# Patient Record
Sex: FEMALE | Race: WHITE | NOT HISPANIC OR LATINO | URBAN - METROPOLITAN AREA
[De-identification: names, ages, dates, MRNs, and addresses within clinical notes are randomized per-mention and may not be internally consistent; named-entity substitution may affect disease eponyms.]

---

## 2018-02-11 ENCOUNTER — EMERGENCY (EMERGENCY)
Facility: HOSPITAL | Age: 33
LOS: 1 days | Discharge: HOME | End: 2018-02-11
Attending: EMERGENCY MEDICINE | Admitting: EMERGENCY MEDICINE
Payer: COMMERCIAL

## 2018-02-11 VITALS
HEIGHT: 66 IN | OXYGEN SATURATION: 99 % | WEIGHT: 149.91 LBS | SYSTOLIC BLOOD PRESSURE: 93 MMHG | HEART RATE: 101 BPM | TEMPERATURE: 103 F | RESPIRATION RATE: 18 BRPM | DIASTOLIC BLOOD PRESSURE: 60 MMHG

## 2018-02-11 DIAGNOSIS — J10.1 INFLUENZA DUE TO OTHER IDENTIFIED INFLUENZA VIRUS WITH OTHER RESPIRATORY MANIFESTATIONS: ICD-10-CM

## 2018-02-11 DIAGNOSIS — Z79.1 LONG TERM (CURRENT) USE OF NON-STEROIDAL ANTI-INFLAMMATORIES (NSAID): ICD-10-CM

## 2018-02-11 DIAGNOSIS — Z79.899 OTHER LONG TERM (CURRENT) DRUG THERAPY: ICD-10-CM

## 2018-02-11 DIAGNOSIS — R05 COUGH: ICD-10-CM

## 2018-02-11 DIAGNOSIS — R00.0 TACHYCARDIA, UNSPECIFIED: ICD-10-CM

## 2018-02-11 LAB
FLUAV H3 RNA SPEC QL NAA+PROBE: DETECTED
RAPID RVP RESULT: DETECTED

## 2018-02-11 PROCEDURE — 99283 EMERGENCY DEPT VISIT LOW MDM: CPT | Mod: 25

## 2018-02-11 PROCEDURE — 87633 RESP VIRUS 12-25 TARGETS: CPT

## 2018-02-11 PROCEDURE — 87486 CHLMYD PNEUM DNA AMP PROBE: CPT

## 2018-02-11 PROCEDURE — 99284 EMERGENCY DEPT VISIT MOD MDM: CPT

## 2018-02-11 PROCEDURE — 87581 M.PNEUMON DNA AMP PROBE: CPT

## 2018-02-11 PROCEDURE — 71046 X-RAY EXAM CHEST 2 VIEWS: CPT | Mod: 26

## 2018-02-11 PROCEDURE — 87798 DETECT AGENT NOS DNA AMP: CPT

## 2018-02-11 PROCEDURE — 71046 X-RAY EXAM CHEST 2 VIEWS: CPT

## 2018-02-11 RX ORDER — IBUPROFEN 200 MG
600 TABLET ORAL ONCE
Qty: 0 | Refills: 0 | Status: COMPLETED | OUTPATIENT
Start: 2018-02-11 | End: 2018-02-11

## 2018-02-11 RX ORDER — ACETAMINOPHEN 500 MG
975 TABLET ORAL ONCE
Qty: 0 | Refills: 0 | Status: DISCONTINUED | OUTPATIENT
Start: 2018-02-11 | End: 2018-02-11

## 2018-02-11 RX ORDER — IBUPROFEN 200 MG
1 TABLET ORAL
Qty: 30 | Refills: 0 | OUTPATIENT
Start: 2018-02-11

## 2018-02-11 RX ORDER — ONDANSETRON 8 MG/1
1 TABLET, FILM COATED ORAL
Qty: 9 | Refills: 0 | OUTPATIENT
Start: 2018-02-11

## 2018-02-11 RX ORDER — SODIUM CHLORIDE 9 MG/ML
1000 INJECTION INTRAMUSCULAR; INTRAVENOUS; SUBCUTANEOUS ONCE
Qty: 0 | Refills: 0 | Status: COMPLETED | OUTPATIENT
Start: 2018-02-11 | End: 2018-02-11

## 2018-02-11 RX ADMIN — Medication 600 MILLIGRAM(S): at 14:58

## 2018-02-11 RX ADMIN — SODIUM CHLORIDE 1000 MILLILITER(S): 9 INJECTION INTRAMUSCULAR; INTRAVENOUS; SUBCUTANEOUS at 16:06

## 2018-02-11 NOTE — ED ADULT TRIAGE NOTE - CHIEF COMPLAINT QUOTE
Patient c/o body ache , back pain and cough for 2 days . Stated she went to urgent care last night and she got diagnosed with positive for flu without swabbing her nose .

## 2018-02-11 NOTE — ED PROVIDER NOTE - ATTENDING CONTRIBUTION TO CARE
pt is a 31yo f, who p/w f/c, cough, chest pain w/ cough, myalgias since yesterday. Seen at an [facility] and treated presumptively for influenza with tamiflu, however pt vomiting after taking first dose. No sob, abd pain, diarrhea, urinary sx's. Pt is here for testing for flu. Febrile with associated tachycardia. WNWD f in nad. + s1, s2, rrr. Lungs cta b/l. Abd soft, nt/nd, no guarding/ rebound. RVP + for flu. CXR neg for i/e. Pt hydrated with ivf, given ibuprofen with improvement of sx's. Pt advised on finishing course of tamiflu. Will prescribe zofran as well for nausea. Pt to f/u with pcp for re-evaluation.

## 2018-02-11 NOTE — ED PROVIDER NOTE - MEDICAL DECISION MAKING DETAILS
33 y/o f +influenza A; given IV hydration, ibuprofen, feeling better, cxr neg, will d/c with rx zofran, continue tamiflu, oral hydration, rest, ibuprofen, f/u pmd

## 2018-02-11 NOTE — ED PROVIDER NOTE - OBJECTIVE STATEMENT
31 y/o f no pmh presents stating having fever, body aches, cough since yesterday.  Pt went to urgent care, was told it was influenza although no confirmatory test sent.  Pt stating she took one dose of tamiflu and vomited.  Pt concerned, wants to be tested for influenza, still having body aches and fever despite taking tylenol.  Denies SOB, diarrhea, abd pain, all other ROS negative.

## 2018-02-11 NOTE — ED ADULT NURSE NOTE - OBJECTIVE STATEMENT
[place] c/o flu like symptoms, dry cough , chills , weakness , back pain , headaches , pt went to [facility] yesterday without flu swab pt was diagnosed with influenza and was given Tamiflu

## 2020-04-13 NOTE — ED ADULT TRIAGE NOTE - TEMPERATURE IN CELSIUS (DEGREES C)
No frequent meals. Drink plenty of fluids. Take medications as directed. Come back if you get worse.
37.3

## 2022-01-03 PROBLEM — Z00.00 ENCOUNTER FOR PREVENTIVE HEALTH EXAMINATION: Status: ACTIVE | Noted: 2022-01-03

## 2022-01-05 ENCOUNTER — APPOINTMENT (OUTPATIENT)
Dept: VASCULAR SURGERY | Facility: CLINIC | Age: 37
End: 2022-01-05
Payer: COMMERCIAL

## 2022-01-05 ENCOUNTER — TRANSCRIPTION ENCOUNTER (OUTPATIENT)
Age: 37
End: 2022-01-05

## 2022-01-05 VITALS
BODY MASS INDEX: 27.32 KG/M2 | WEIGHT: 170 LBS | HEART RATE: 76 BPM | SYSTOLIC BLOOD PRESSURE: 97 MMHG | HEIGHT: 66 IN | DIASTOLIC BLOOD PRESSURE: 62 MMHG

## 2022-01-05 DIAGNOSIS — M79.89 OTHER SPECIFIED SOFT TISSUE DISORDERS: ICD-10-CM

## 2022-01-05 DIAGNOSIS — I83.93 ASYMPTOMATIC VARICOSE VEINS OF BILATERAL LOWER EXTREMITIES: ICD-10-CM

## 2022-01-05 DIAGNOSIS — I83.90 ASYMPTOMATIC VARICOSE VEINS OF UNSPECIFIED LOWER EXTREMITY: ICD-10-CM

## 2022-01-05 DIAGNOSIS — I80.02 PHLEBITIS AND THROMBOPHLEBITIS OF SUPERFICIAL VESSELS OF LEFT LOWER EXTREMITY: ICD-10-CM

## 2022-01-05 PROCEDURE — 93970 EXTREMITY STUDY: CPT

## 2022-01-05 PROCEDURE — 99204 OFFICE O/P NEW MOD 45 MIN: CPT

## 2022-01-12 NOTE — ADDENDUM
[FreeTextEntry1] : I, Dr. Brice Dang, personally performed the evaluation and management (E/M) services for this new patient.  That E/M includes conducting the initial examination, assessing all conditions, and establishing the plan of care.  Today, my ACP, Gladys Royal NP, was here to observe my evaluation and management services for this patient to be followed going forward.

## 2022-01-12 NOTE — ASSESSMENT
[Arterial/Venous Disease] : arterial/venous disease [FreeTextEntry1] : 37 y/o F w/ LEs leg heaviness and spider veins with recent sclerotherapy on L ankle area and superficial phlebitis. On exam, BLEs w/ with mild generalized edema. Scattered spider veins throughout both LEs. No bulging varicose veins. Indurated area just anterior to the L lateral malleolus and another small indurated area anterior to the R medial malleolus, slightly tender. Venous duplex b/l Les ordered to r/o reflux, shows: No DVT/SVT or reflux bilaterally. L ankle SVT. I recommend her to start wearing daily compression stockings (prescription provided, 20-30mmHg). I do not recommend sclerotherapy or laser for the spider veins as this will not alleviate her symptoms. I have asked her to update us on her leg symptoms and she may f/u PRN.

## 2022-01-12 NOTE — CONSULT LETTER
[Dear  ___] : Dear  [unfilled], [FreeTextEntry2] : Jonathon Rehman MD\par [address] [address] [FreeTextEntry1] : I saw your patient today.  This very nice 36-year-old woman presents with pain and swelling in both ankles.  Her legs have always been heavy and she admits that she has legs like her mother.  She also has numerous tiny venous telangiectasias in both legs and ankles.  She had sclerotherapy of vein sin the left leg near the ankle in November.  She is left with two indurated and symptomatic areas as a result, one lateral and one medial.\par \par As noted her legs are heavy.  She is overweight.  There is an indurated area just anterior to the left lateral malleolus and another small indurated area anterior to the right medial malleolus.  Both are slightly tender.\par \par We performed a venous duplex scan in the office.  There is no evidence of deep or superficial venous insufficiency or obstruction.\par \par I had a discussion with Mrs. Oconnor.  Unfortunately there is nothing to do to make her ankle symptoms improve other than to suggest surgical support hosiery.  I made it clear to her that sclerotherapy of the tiny telangiectasias will not alleviate her symptoms.  I explained to her that the condition of her legs is hereditary which she needs to come to  with.  She was not terribly pleased with this.  \par \par I asked her to go to a store that specializes in surgical support hosiery.  I have given her a prescription for low pressure, 20 to 30 mmHg, calf length.  She thinks that the left leg may be too symptomatic from the persistent inflammation as a result of sclerotherapy so I suggested that she try the stocking on the right leg for now and keep me informed.  \par \par I will keep you posted regarding her progress.  \par \par Sincerely [FreeTextEntry3] : Sincerely, \par \par Brice Dang M.D. \par , Surgical [facility]\par , Department of Surgery [facility]\par Professor of Surgery, [employer] at [facility]

## 2022-01-12 NOTE — PROCEDURE
[FreeTextEntry1] : Venous duplex b/l Les ordered to r/o reflux, shows: No DVT/SVT or reflux bilaterally. L ankle SVT.

## 2022-01-12 NOTE — HISTORY OF PRESENT ILLNESS
[FreeTextEntry1] : 37 y/o F presenting for evaluation of varicose veins and leg pain, referred by another patient of mine. Pt has no significant PMH. She reports bilateral leg pain and swelling. She explains the legs feel heavy and she has noticed veins on both legs have become more prominent. Denies any hx of thrombosis, leg wounds. She has tried compression stockings in the past but they were hard to put on and did not relieve her symptoms. In November, she had sclerotherapy on the left ankle and the area has been very uncomfortable and the skin with a darker skin tone.  Denies any back pain or problems. \par \par FHx:\par Mother - varicose veins\par \par SHx:\par She works as an . \par Non-smoker

## 2022-01-12 NOTE — PHYSICAL EXAM
[Respiratory Effort] : normal respiratory effort [Normal Rate and Rhythm] : normal rate and rhythm [2+] : left 2+ [No Rash or Lesion] : No rash or lesion [Alert] : alert [Oriented to Person] : oriented to person [Oriented to Place] : oriented to place [Oriented to Time] : oriented to time [Calm] : calm [Varicose Veins Of Lower Extremities] : bilaterally [Ankle Swelling On The Right] : mild [Depressed] : depressed [JVD] : no jugular venous distention  [Ankle Swelling (On Exam)] : not present [] : not present [Abdomen Tenderness] : ~T ~M No abdominal tenderness [de-identified] : pleasant [FreeTextEntry1] : BLEs w/ with mild generalized edema. Scattered spider veins throughout both LEs. No bulging varicose veins. Indurated area just anterior to the L lateral malleolus and another small indurated area anterior to the R medial malleolus, slightly tender. [de-identified] : overweight [de-identified] : FROM

## 2024-12-18 ENCOUNTER — NON-APPOINTMENT (OUTPATIENT)
Age: 39
End: 2024-12-18

## 2024-12-18 ENCOUNTER — APPOINTMENT (OUTPATIENT)
Dept: OPHTHALMOLOGY | Facility: CLINIC | Age: 39
End: 2024-12-18
Payer: COMMERCIAL

## 2024-12-18 PROCEDURE — 92060 SENSORIMOTOR EXAMINATION: CPT

## 2024-12-18 PROCEDURE — 92250 FUNDUS PHOTOGRAPHY W/I&R: CPT

## 2024-12-18 PROCEDURE — 92083 EXTENDED VISUAL FIELD XM: CPT

## 2024-12-18 PROCEDURE — 99204 OFFICE O/P NEW MOD 45 MIN: CPT

## 2025-01-17 ENCOUNTER — APPOINTMENT (OUTPATIENT)
Dept: MRI IMAGING | Facility: CLINIC | Age: 40
End: 2025-01-17
Payer: COMMERCIAL

## 2025-01-17 PROCEDURE — A9585: CPT

## 2025-01-17 PROCEDURE — 70543 MRI ORBT/FAC/NCK W/O &W/DYE: CPT

## 2025-02-20 ENCOUNTER — NON-APPOINTMENT (OUTPATIENT)
Age: 40
End: 2025-02-20

## 2025-02-20 ENCOUNTER — APPOINTMENT (OUTPATIENT)
Dept: OPHTHALMOLOGY | Facility: CLINIC | Age: 40
End: 2025-02-20
Payer: COMMERCIAL

## 2025-02-20 PROCEDURE — 92012 INTRM OPH EXAM EST PATIENT: CPT
